# Patient Record
Sex: FEMALE | Race: WHITE | NOT HISPANIC OR LATINO | URBAN - METROPOLITAN AREA
[De-identification: names, ages, dates, MRNs, and addresses within clinical notes are randomized per-mention and may not be internally consistent; named-entity substitution may affect disease eponyms.]

---

## 2017-05-04 ENCOUNTER — APPOINTMENT (RX ONLY)
Dept: URBAN - METROPOLITAN AREA CLINIC 124 | Facility: CLINIC | Age: 67
Setting detail: DERMATOLOGY
End: 2017-05-04

## 2017-05-04 DIAGNOSIS — Z41.9 ENCOUNTER FOR PROCEDURE FOR PURPOSES OTHER THAN REMEDYING HEALTH STATE, UNSPECIFIED: ICD-10-CM

## 2017-05-04 PROCEDURE — ? COOLSCULPTING

## 2017-05-04 ASSESSMENT — LOCATION DETAILED DESCRIPTION DERM
LOCATION DETAILED: RIGHT SUPERIOR LATERAL LOWER BACK
LOCATION DETAILED: LEFT SUPERIOR LATERAL LOWER BACK

## 2017-05-04 ASSESSMENT — LOCATION SIMPLE DESCRIPTION DERM
LOCATION SIMPLE: RIGHT LOWER BACK
LOCATION SIMPLE: LEFT LOWER BACK

## 2017-05-04 ASSESSMENT — LOCATION ZONE DERM: LOCATION ZONE: TRUNK

## 2017-05-04 NOTE — PROCEDURE: COOLSCULPTING
Indication: non-invasive fat removal
Consent: Written consent obtained, risks reviewed including but not limited to blistering from suction, darker or lighter pigmentary change, bruising, and/or need for multiple treatments.
Detail Level: Detailed
Device: Zeltiq CoolSculpt
Post-Care Instructions: I reviewed with the patient in detail post-care instructions. Patient should stay away from the sun and wear sun protection until treated areas are fully healed.
Applicators: CoolCurve+
Time (Minutes): [facility]
Suction Settings: 70-80-70
Location: flank (right)
Location: flank (left)
Time (Minutes): 60

## 2017-09-22 ENCOUNTER — APPOINTMENT (RX ONLY)
Dept: URBAN - METROPOLITAN AREA CLINIC 126 | Facility: CLINIC | Age: 67
Setting detail: DERMATOLOGY
End: 2017-09-22

## 2017-09-22 DIAGNOSIS — Z41.9 ENCOUNTER FOR PROCEDURE FOR PURPOSES OTHER THAN REMEDYING HEALTH STATE, UNSPECIFIED: ICD-10-CM

## 2017-09-22 PROCEDURE — ? MEDICAL CONSULTATION: BROWN SPOTS

## 2017-09-22 PROCEDURE — 99025: CPT

## 2017-09-25 ENCOUNTER — APPOINTMENT (RX ONLY)
Dept: URBAN - METROPOLITAN AREA CLINIC 116 | Facility: CLINIC | Age: 67
Setting detail: DERMATOLOGY
End: 2017-09-25

## 2017-09-25 DIAGNOSIS — Z41.9 ENCOUNTER FOR PROCEDURE FOR PURPOSES OTHER THAN REMEDYING HEALTH STATE, UNSPECIFIED: ICD-10-CM

## 2017-09-25 DIAGNOSIS — B00.1 HERPESVIRAL VESICULAR DERMATITIS: ICD-10-CM

## 2017-09-25 PROCEDURE — ? PRESCRIPTION

## 2017-09-25 PROCEDURE — ? ALMA LASER

## 2017-09-25 RX ORDER — HYDROQUINONE 4 %
CREAM (GRAM) TOPICAL
Qty: 1 | Refills: 0 | Status: ERX | COMMUNITY
Start: 2017-09-25

## 2017-09-25 RX ORDER — VALACYCLOVIR HYDROCHLORIDE 1 G/1
TABLET, FILM COATED ORAL
Qty: 8 | Refills: 2 | Status: ERX | COMMUNITY
Start: 2017-09-25

## 2017-09-25 RX ADMIN — Medication: at 00:00

## 2017-09-25 RX ADMIN — VALACYCLOVIR HYDROCHLORIDE: 1 TABLET, FILM COATED ORAL at 00:00

## 2017-09-25 ASSESSMENT — LOCATION ZONE DERM
LOCATION ZONE: NOSE
LOCATION ZONE: FACE

## 2017-09-25 ASSESSMENT — LOCATION DETAILED DESCRIPTION DERM
LOCATION DETAILED: LEFT SUPERIOR CENTRAL MALAR CHEEK
LOCATION DETAILED: RIGHT INFERIOR CENTRAL MALAR CHEEK
LOCATION DETAILED: LEFT CENTRAL ZYGOMA
LOCATION DETAILED: RIGHT INFERIOR MEDIAL MALAR CHEEK
LOCATION DETAILED: NASAL ROOT
LOCATION DETAILED: RIGHT INFERIOR LATERAL MALAR CHEEK
LOCATION DETAILED: LEFT MEDIAL ZYGOMA
LOCATION DETAILED: RIGHT SUPERIOR MEDIAL BUCCAL CHEEK
LOCATION DETAILED: RIGHT LATERAL ZYGOMA
LOCATION DETAILED: LEFT SUPERIOR PREAURICULAR CHEEK

## 2017-09-25 ASSESSMENT — LOCATION SIMPLE DESCRIPTION DERM
LOCATION SIMPLE: RIGHT CHEEK
LOCATION SIMPLE: LEFT CHEEK
LOCATION SIMPLE: LEFT ZYGOMA
LOCATION SIMPLE: RIGHT ZYGOMA
LOCATION SIMPLE: NOSE

## 2017-09-25 NOTE — PROCEDURE: ALMA LASER
Er:Yag Post-Care Generic: Post care reviewed with patient.
Pulse Width: 1 sec
Fluence (J/Cm2): 1
Frequency In Hz: Unibody
Clear Lift Post-Care: Post care reviewed with patient. Patient should avoid direct sunlight and wear broad spectrum sunscreen daily.
Energy In Mj: [facility]
Pulse Width: 30 ms
Fluence (Mj/Cm2): 4
Treatment Fluence In Mj: 10
Pulse Duration In Ms: 12
Pulse Mode (Optional): L
Consent: Written consent obtained, risks reviewed including but not limited to crusting, scabbing, blistering, scarring, darker or lighter pigmentary change, systemic reactions, ulceration, incidental hair removal, bruising, and/or incomplete removal.
Energy In Kj: [address]
Nd:Yag Post-Care: Immediate endpoint whitening and pinpoint bleeding. Vaseline and ice applied. Post care reviewed with patient.
Power In Wang: 48
Detail Level: Simple
Ipl Post-Care: Vaseline and ice applied. Post care reviewed with patient.
Spot Size In Mm: 3
Post-Care Instructions: I reviewed with the patient in detail post-care instructions. Patient should avoid sunlight and wear sun protection.
Fluence (J/Cm2): 5
Post-Care To Use?: Generic
Energy (Mj/P): [address] [facility]

## 2017-10-18 ENCOUNTER — APPOINTMENT (RX ONLY)
Dept: URBAN - METROPOLITAN AREA CLINIC 126 | Facility: CLINIC | Age: 67
Setting detail: DERMATOLOGY
End: 2017-10-18

## 2017-10-18 DIAGNOSIS — Z41.9 ENCOUNTER FOR PROCEDURE FOR PURPOSES OTHER THAN REMEDYING HEALTH STATE, UNSPECIFIED: ICD-10-CM

## 2017-10-18 PROCEDURE — ? COOLSCULPTING

## 2017-10-18 ASSESSMENT — LOCATION DETAILED DESCRIPTION DERM
LOCATION DETAILED: RIGHT INFERIOR LATERAL LOWER BACK
LOCATION DETAILED: LEFT SUPERIOR LATERAL LOWER BACK

## 2017-10-18 ASSESSMENT — LOCATION ZONE DERM: LOCATION ZONE: TRUNK

## 2017-10-18 ASSESSMENT — LOCATION SIMPLE DESCRIPTION DERM
LOCATION SIMPLE: RIGHT LOWER BACK
LOCATION SIMPLE: LEFT LOWER BACK

## 2017-10-18 NOTE — PROCEDURE: COOLSCULPTING
Consent: Written consent obtained, risks reviewed including but not limited to blistering from suction, darker or lighter pigmentary change, bruising, and/or need for multiple treatments.
Indication: non-invasive fat removal
Device: Zeltiq CoolSculpt
Post-Care Instructions: I reviewed with the patient in detail post-care instructions. Patient should stay away from the sun and wear sun protection until treated areas are fully healed.
Detail Level: Detailed
Time (Minutes): [facility]
Location: flank (right)
Suction Settings: 70-80-70
Applicators: CoolCurve+
Time (Minutes): 60
Location: flank (left)

## 2022-03-11 ENCOUNTER — APPOINTMENT (RX ONLY)
Dept: URBAN - METROPOLITAN AREA CLINIC 125 | Facility: CLINIC | Age: 72
Setting detail: DERMATOLOGY
End: 2022-03-11

## 2022-03-11 DIAGNOSIS — L57.8 OTHER SKIN CHANGES DUE TO CHRONIC EXPOSURE TO NONIONIZING RADIATION: ICD-10-CM

## 2022-03-11 DIAGNOSIS — L50.1 IDIOPATHIC URTICARIA: ICD-10-CM

## 2022-03-11 PROBLEM — L30.9 DERMATITIS, UNSPECIFIED: Status: ACTIVE | Noted: 2022-03-11

## 2022-03-11 PROCEDURE — ? PRESCRIPTION MEDICATION MANAGEMENT

## 2022-03-11 PROCEDURE — ? COUNSELING

## 2022-03-11 PROCEDURE — ? PRESCRIPTION

## 2022-03-11 PROCEDURE — 99203 OFFICE O/P NEW LOW 30 MIN: CPT

## 2022-03-11 RX ORDER — CEFDINIR 300 MG/1
CAPSULE ORAL
Qty: 20 | Refills: 1 | Status: ERX | COMMUNITY
Start: 2022-03-11

## 2022-03-11 RX ORDER — TRIAMCINOLONE ACETONIDE 1 MG/G
CREAM TOPICAL
Qty: 453.6 | Refills: 4 | Status: ERX | COMMUNITY
Start: 2022-03-11

## 2022-03-11 RX ADMIN — CEFDINIR: 300 CAPSULE ORAL at 00:00

## 2022-03-11 RX ADMIN — TRIAMCINOLONE ACETONIDE: 1 CREAM TOPICAL at 00:00

## 2022-03-11 ASSESSMENT — LOCATION SIMPLE DESCRIPTION DERM
LOCATION SIMPLE: LEFT THIGH
LOCATION SIMPLE: RIGHT THIGH
LOCATION SIMPLE: LEFT PRETIBIAL REGION

## 2022-03-11 ASSESSMENT — LOCATION DETAILED DESCRIPTION DERM
LOCATION DETAILED: LEFT PROXIMAL PRETIBIAL REGION
LOCATION DETAILED: LEFT ANTERIOR DISTAL THIGH
LOCATION DETAILED: RIGHT ANTERIOR DISTAL THIGH

## 2022-03-11 ASSESSMENT — LOCATION ZONE DERM: LOCATION ZONE: LEG

## 2022-03-11 NOTE — PROCEDURE: PRESCRIPTION MEDICATION MANAGEMENT
Detail Level: Zone
Render In Strict Bullet Format?: No
Initiate Treatment: Triamcinolone
Initiate Treatment: Cefdinir BID x 10 days

## 2022-03-11 NOTE — PROCEDURE: MIPS QUALITY
Have you seen any forms?  
I believe I signed before I left for my trip 2 weeks ago  
Patient called and stated she dropped off a form for school regarding her TB test. She has not received it back. Gave it to Samantha. Stated she can bring a new form in if necessary. (she works in med spec)    Please call back and advise.  
Quality 130: Documentation Of Current Medications In The Medical Record: Documentation of a medical reason(s) for not documenting, updating, or reviewing the patientâs current medications list (e.g., patient is in an urgent or emergent medical situation)
Detail Level: Detailed